# Patient Record
Sex: FEMALE | Race: WHITE
[De-identification: names, ages, dates, MRNs, and addresses within clinical notes are randomized per-mention and may not be internally consistent; named-entity substitution may affect disease eponyms.]

---

## 2017-04-12 ENCOUNTER — HOSPITAL ENCOUNTER (OUTPATIENT)
Dept: HOSPITAL 80 - FIMAGING | Age: 67
End: 2017-04-12
Attending: GENERAL ACUTE CARE HOSPITAL
Payer: COMMERCIAL

## 2017-04-12 DIAGNOSIS — Z12.31: Primary | ICD-10-CM

## 2017-04-12 PROCEDURE — G0202 SCR MAMMO BI INCL CAD: HCPCS

## 2017-05-02 ENCOUNTER — HOSPITAL ENCOUNTER (OUTPATIENT)
Dept: HOSPITAL 80 - FIMAGING | Age: 67
End: 2017-05-02
Payer: COMMERCIAL

## 2017-05-02 DIAGNOSIS — N63: Primary | ICD-10-CM

## 2017-05-02 PROCEDURE — G0206 DX MAMMO INCL CAD UNI: HCPCS

## 2017-05-15 ENCOUNTER — HOSPITAL ENCOUNTER (OUTPATIENT)
Dept: HOSPITAL 80 - FIMAGING | Age: 67
End: 2017-05-15
Payer: COMMERCIAL

## 2017-05-15 DIAGNOSIS — N60.91: ICD-10-CM

## 2017-05-15 DIAGNOSIS — N60.81: ICD-10-CM

## 2017-05-15 DIAGNOSIS — N60.11: Primary | ICD-10-CM

## 2017-05-15 PROCEDURE — 0HBT3ZX EXCISION OF RIGHT BREAST, PERCUTANEOUS APPROACH, DIAGNOSTIC: ICD-10-PCS | Performed by: RADIOLOGY

## 2017-05-15 PROCEDURE — G0206 DX MAMMO INCL CAD UNI: HCPCS

## 2017-05-24 ENCOUNTER — HOSPITAL ENCOUNTER (OUTPATIENT)
Dept: HOSPITAL 80 - FIMAGING | Age: 67
End: 2017-05-24
Attending: FAMILY MEDICINE
Payer: COMMERCIAL

## 2017-05-24 DIAGNOSIS — R31.29: Primary | ICD-10-CM

## 2018-01-03 ENCOUNTER — HOSPITAL ENCOUNTER (OUTPATIENT)
Dept: HOSPITAL 80 - FIMAGING | Age: 68
End: 2018-01-03
Attending: OBSTETRICS & GYNECOLOGY
Payer: COMMERCIAL

## 2018-01-03 DIAGNOSIS — Z09: Primary | ICD-10-CM

## 2018-01-03 DIAGNOSIS — N60.99: ICD-10-CM

## 2018-04-17 ENCOUNTER — HOSPITAL ENCOUNTER (OUTPATIENT)
Dept: HOSPITAL 80 - FIMAGING | Age: 68
End: 2018-04-17
Payer: COMMERCIAL

## 2018-04-17 DIAGNOSIS — Z12.31: Primary | ICD-10-CM

## 2019-02-13 ENCOUNTER — HOSPITAL ENCOUNTER (EMERGENCY)
Dept: HOSPITAL 80 - FED | Age: 69
Discharge: HOME | End: 2019-02-13
Payer: COMMERCIAL

## 2019-02-13 VITALS — SYSTOLIC BLOOD PRESSURE: 164 MMHG | DIASTOLIC BLOOD PRESSURE: 98 MMHG

## 2019-02-13 DIAGNOSIS — Z86.12: ICD-10-CM
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
## 2019-02-13 DIAGNOSIS — R26.81: ICD-10-CM

## 2019-02-13 DIAGNOSIS — S09.90XA: ICD-10-CM

## 2019-02-13 DIAGNOSIS — S81.011A: Primary | ICD-10-CM
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
## 2019-02-13 PROCEDURE — 0HQKXZZ REPAIR RIGHT LOWER LEG SKIN, EXTERNAL APPROACH: ICD-10-PCS

## 2019-02-13 NOTE — EDPHY
H & P


Stated Complaint: tripped & fell hit L eyebrow, R knee lac pta


Time Seen by Provider: 02/13/19 15:51


HPI/ROS: 





CHIEF COMPLAINT:  Head injury, right knee injury





HISTORY OF PRESENT ILLNESS:  68-year-old female with no anticoagulant use 

arrives via private vehicle.  Patient has a history of polio, has a baseline of 

unstable gait subsequently.  Today she was walking, tripped on incongruity on 

the ground, fell forward impacting her left frontal region as well as 

sustaining laceration and pain to right anterior knee.  No loss of 

consciousness.  No headache.  No nausea or vomiting.  No midline C-spine pain.  

No peripheral paresthesia, weakness, numbness.  No chest pain.





This was a mechanical episode with no syncope.  No prodrome.








REVIEW OF SYSTEMS:


10 systems reviewed and negative with the exception of the elements mentioned 

in the history of present illness








PAST MEDICAL/SURGICAL HISTORY: [no anticoagulant use.  History of polio..  

History of [place] evon placement 1960s.





SOCIAL HISTORY: denies alcohol use at time of incident





*********





PHYSICAL EXAM 





1) GENERAL: Well-developed, well-nourished, alert and oriented.  Appears to be 

in no acute distress. Answering questions appropriately.


2) HEAD: [Normocephalic, left lateral periorbital contusion and abrasion with 

no laceration.


3) HEENT: Pupils equal, round, reactive to light bilaterally. Negative Horners. 

Nasopharynx, oropharynx, clear.   No deformity or angulation of nose.  No 

septal hematoma.  Extraocular movements are intact and do not elicit abnormal 

gaze or diplopia.  No rhinorrhea. No oral trauma. Ears bilaterally with normal 

tympanic membranes.  No hemotympanum.  No fluid or blood in the external 

auditory canal.  No raccoon eyes.  No Alfaro sign.  Teeth are normally aligned 

with no gross malocclusion, TMJ bilaterally nontender, facial bones nontender 

including the zygomatic arch, maxilla mandible.


4) NECK:  No cervical collar is on. Posterior cervical spine is nontender, no 

stepoff, no effusion. Full range of motion which does not elicit any midline 

cervical spine pain, no posterior midline tenderness, no step-off.


5) LUNGS: Clear to auscultation bilaterally, no wheezes, no rhonchi, no 

retractions.  No obvious signs of trauma.  No chest wall pain.  No flaring, no 

grunting.  Moving symmetrically.  No crepitus. 


6) HEART:  Regular rate and rhythm, 


7) ABDOMEN: No guarding, no rebound, no focal tenderness, no peritoneal signs, 

no signs of trauma, no ecchymosis


8) MUSCULOSKELETAL:  Right lower extremity:  2 discrete, 2 cm laceration 

overlying the patella.  Full flexion extension of the right knee with no gross 

instability.  No clinical evidence of traumatic arthrotomy.  Proximally and 

distally nontender.  Distal DP PT pulses present and brisk.  Otherwise,  Moving 

all extremities, no focal areas of tenderness, no obvious trauma.


9) BACK:  Midline surgical scar noted with No midline vertebral tenderness, no 

fluctuance, no step-off, no obvious trauma, no visual or palpable abnormality.


10) SKIN:   laceration right knee





***********





DIFFERENTIAL DIAGNOSIS:  Not necessarily in any particular order, my 

differential diagnosis includes, but is not limited to, concussion, skull 

fracture, intraparenchymal contusion, subarachnoid, subdural and epidural 

hematoma.  The patient understands that this diagnosis is provisional and can 

never be 100% accurate. 





- Personal History


Current Tetanus/Diphtheria Vaccine: Unsure


Current Tetanus Diphtheria and Acellular Pertussis (TDAP): Unsure





- Medical/Surgical History


Hx Asthma: No


Hx Chronic Respiratory Disease: No


Hx Diabetes: No


Hx Cardiac Disease: No


Hx Renal Disease: No


Hx Cirrhosis: No


Hx Alcoholism: No


Hx HIV/AIDS: No


Hx Splenectomy or Spleen Trauma: No


Other PMH: Polio.  scoliosis with rods placed.  glaucoma





- Social History


Smoking Status: Never smoked


Constitutional: 


 Initial Vital Signs











Temperature (C)  36.5 C   02/13/19 15:11


 


Heart Rate  90   02/13/19 15:11


 


Respiratory Rate  18   02/13/19 15:11


 


Blood Pressure  140/102 H  02/13/19 15:11


 


O2 Sat (%)  97   02/13/19 15:11








 











O2 Delivery Mode               Room Air














Allergies/Adverse Reactions: 


 





No Known Allergies Allergy (Unverified 02/13/19 15:10)


 








Home Medications: 














 Medication  Instructions  Recorded


 


Cephalexin [Keflex] 500 mg PO TID 5 Days  cap 02/13/19


 


Cymbalta  02/13/19


 


Lisinopril  02/13/19


 


Travatan Z  02/13/19


 


Vicodin 5-300 mg Tablet  02/13/19














Medical Decision Making





- Diagnostics


Imaging Results: 


 Imaging Impressions





Head CT  02/13/19 16:02


Impression: Negative. No acute fracture or evidence of acute intracranial 

injury. 


 


Findings discussed with Emergency Department physician assistant, RYAN Carrizales on 2/13/2019, 16:54.


 


 


 


 








Knee X-Ray  02/13/19 16:02


Impression:


1. No acute fracture right knee.


2. Moderate medial compartment patellofemoral joint space narrowing with 

associated medial osteophytes.


3. Intra-articular calcific fragment suspected posterior to the tibial spine.


4. Soft tissue contusion superficial to the patella.








Images reviewed myself


Procedures: 





Procedure:  Laceration repair right knee


 


I explained the indications, risks and benefits for both laceration repair and 

anesthetic administration. Verbal consent was obtained from the patient.   The 

laceration on the right anterior knee was anesthetized using 0.5% bupivicaine 

with epinephrine. After anesthetic administered the patient was observed for a 

period of time and had no apparent adverse effects. The wound was cleaned, 

prepped, draped in normal sterile fashion and explored to its base.  No foreign 

body seen, no foreign bodies palpated. There were no deep structures involved.  

No clinical evidence of joint capsule compromise.  The lacerations were each 

closed with 3 simple interrupted 4 0 Prolene sutures for total of 6 sutures.  

The wound repair was complex.  The procedure was performed by myself. Patient 

has been informed that scarring will occur, although efforts have been made to 

minimize this.  Bulky Ace wrap dressing applied.


ED Course/Re-evaluation: 





4:05 p.m.:  Head CT ordered in this patient for trauma for the following 

indication:  Greater than 65 years old.  Will also obtain x-ray of the right 

knee.





Patient was re-evaluated with serial exams.  I reviewed his negative CT imaging 

with radiologist, reviewed the images myself and discussed the imaging results 

with the patient.  Discussed with her my usual customary head injury 

precautions instructions.





Regarding the patient's right knee injury, I think that traumatic arthrotomy is 

less than likely in this patient.  This is based on exam findings and no 

evidence of free intra-articular air.  At this time I do not think that the 

benefits of CT imaging or saline load testing of the joint outweigh the risks.  

I discussed this with the patient.  She verbalized understanding accepting 

this.  Definitely if she develops erythema, pain with ambulation or any other 

symptoms she needs to seek immediate medical attention.  I also discussed with 

patient's limitations of x-ray, notably that non osseous injury is not ruled out

, occult fracture not ruled out.  Recommended follow-up with orthopedics and 

provide her this referral information.  I  Do not think that emergent MRI is 

indicated.





4:27 p.m.:  CT imaging of the head interpreted by staff radiologist is negative 

for posttraumatic sequelae.  Images reviewed myself.





5:03 p.m.:  Patient will be discharged home with my usual and customary 

orthopedic and head injury precautions instructions.  She feels comfortable 

being discharged.





Departure





- Departure


Disposition: Home, Routine, Self-Care


Clinical Impression: 


 History of poliomyelitis





Head injury due to trauma


Qualifiers:


 Encounter type: initial encounter Qualified Code(s): S09.90XA - Unspecified 

injury of head, initial encounter





Laceration of right knee


Qualifiers:


 Encounter type: initial encounter Qualified Code(s): S81.011A - Laceration 

without foreign body, right knee, initial encounter





Condition: Good


Instructions:  Laceration (ED), Head Injury (ED)


Additional Instructions: 


Return to the ER if you develop redness, swelling, discharge, warmth to the 

wound, red streaks going up your leg, painful walking, painful range of motion 

or any other symptoms that concern you.  I recommend you follow up with and 

orthopedic surgeon.  Although the x-ray demonstrates no definitive bony injury, 

non bony injury such as ligament, tendon, muscle is not ruled out.





Regarding your head injury, ALTHOUGH THERE IS NO EVIDENCE OF SERIOUS HEAD 

INJURY AT THIS TIME, DELAYED SIGNS CAN APPEAR 24 TO 48 HOURS AFTER INJURY.  

PLEASE RETURN TO THE EMERGENCY DEPARTMENT (ED) IMMEDIATELY IF YOU HAVE 

INCREASED HEADACHE, PERSISTENT HEADACHE, VOMITING, WEAKNESS, CONFUSION OR 

VISUAL PROBLEMS.  WE RECOMMEND THAT YOU DO NOT RESUME CONTACT SPORTS OR 

ACTIVITIES THAT TAKE COORDINATION OR BALANCE SUCH AS SKIING OR RIDING A BICYCLE 

UNTIL CLEARED TO DO SO BY YOUR DOCTOR OR BY A NEUROLOGIST.








Referrals: 


Yousuf Pratt MD [Medical Doctor] - 5-7 days, call for appt.


Return, to the ER in 14 days for suture removal [Other] - As per Instructions


Prescriptions: 


Cephalexin [Keflex] 500 mg PO TID 5 Days  cap